# Patient Record
Sex: FEMALE | Race: WHITE | NOT HISPANIC OR LATINO
[De-identification: names, ages, dates, MRNs, and addresses within clinical notes are randomized per-mention and may not be internally consistent; named-entity substitution may affect disease eponyms.]

---

## 2017-08-28 ENCOUNTER — APPOINTMENT (OUTPATIENT)
Dept: OBGYN | Facility: CLINIC | Age: 67
End: 2017-08-28
Payer: COMMERCIAL

## 2017-08-28 PROCEDURE — 99397 PER PM REEVAL EST PAT 65+ YR: CPT

## 2019-04-01 ENCOUNTER — APPOINTMENT (OUTPATIENT)
Dept: SURGERY | Facility: CLINIC | Age: 69
End: 2019-04-01
Payer: MEDICARE

## 2019-04-01 VITALS
HEIGHT: 61 IN | TEMPERATURE: 98.1 F | HEART RATE: 62 BPM | DIASTOLIC BLOOD PRESSURE: 83 MMHG | BODY MASS INDEX: 33.99 KG/M2 | SYSTOLIC BLOOD PRESSURE: 149 MMHG | WEIGHT: 180 LBS

## 2019-04-01 DIAGNOSIS — R10.31 RIGHT LOWER QUADRANT PAIN: ICD-10-CM

## 2019-04-01 PROCEDURE — 99203 OFFICE O/P NEW LOW 30 MIN: CPT

## 2019-04-02 ENCOUNTER — OUTPATIENT (OUTPATIENT)
Dept: OUTPATIENT SERVICES | Facility: HOSPITAL | Age: 69
LOS: 1 days | End: 2019-04-02
Payer: MEDICARE

## 2019-04-02 ENCOUNTER — APPOINTMENT (OUTPATIENT)
Dept: CT IMAGING | Facility: CLINIC | Age: 69
End: 2019-04-02
Payer: MEDICARE

## 2019-04-02 DIAGNOSIS — Z98.89 OTHER SPECIFIED POSTPROCEDURAL STATES: Chronic | ICD-10-CM

## 2019-04-02 DIAGNOSIS — Z00.8 ENCOUNTER FOR OTHER GENERAL EXAMINATION: ICD-10-CM

## 2019-04-02 PROCEDURE — 74177 CT ABD & PELVIS W/CONTRAST: CPT | Mod: 26

## 2019-04-02 PROCEDURE — 74177 CT ABD & PELVIS W/CONTRAST: CPT

## 2019-04-02 PROCEDURE — 82565 ASSAY OF CREATININE: CPT

## 2019-04-03 NOTE — PHYSICAL EXAM
[Respiratory Effort] : normal respiratory effort [Alert] : alert [Oriented to Person] : oriented to person [Oriented to Place] : oriented to place [Oriented to Time] : oriented to time [Calm] : calm [JVD] : no jugular venous distention  [Abdominal Masses] : No abdominal masses [Abdomen Tenderness] : ~T ~M No abdominal tenderness [de-identified] : KELLY MOJICA NAD [de-identified] : EOMI [de-identified] : soft NT ND. No obvious bulges noted in the groins.

## 2019-04-03 NOTE — HISTORY OF PRESENT ILLNESS
[de-identified] : 69 yo female with h/o b/l inguinal hernia repair with mesh 30+ years ago presents today for evaluation of right groin pain which has been intermittent for the past 2-3 months. She denies any nausea or vomiting. No palpable bulges. She states using an exercise bike with pain at times and pain free other times.

## 2019-09-09 ENCOUNTER — RESULT REVIEW (OUTPATIENT)
Age: 69
End: 2019-09-09

## 2019-09-10 ENCOUNTER — APPOINTMENT (OUTPATIENT)
Dept: OBGYN | Facility: CLINIC | Age: 69
End: 2019-09-10
Payer: MEDICARE

## 2019-09-10 PROCEDURE — G0101: CPT

## 2019-09-30 ENCOUNTER — FORM ENCOUNTER (OUTPATIENT)
Age: 69
End: 2019-09-30

## 2020-06-05 ENCOUNTER — APPOINTMENT (OUTPATIENT)
Dept: ENDOCRINOLOGY | Facility: CLINIC | Age: 70
End: 2020-06-05
Payer: MEDICARE

## 2020-06-05 VITALS
OXYGEN SATURATION: 97 % | WEIGHT: 178 LBS | TEMPERATURE: 99.5 F | HEART RATE: 75 BPM | DIASTOLIC BLOOD PRESSURE: 80 MMHG | BODY MASS INDEX: 33.63 KG/M2 | SYSTOLIC BLOOD PRESSURE: 140 MMHG

## 2020-06-05 PROCEDURE — 99443: CPT | Mod: 95

## 2020-06-05 NOTE — REVIEW OF SYSTEMS
[Recent Weight Gain (___ Lbs)] : recent weight gain: [unfilled] lbs [Fatigue] : fatigue [Dysphagia] : no dysphagia [Neck Pain] : no neck pain [Dysphonia] : no dysphonia [Nausea] : no nausea [Vomiting] : no vomiting [Polyuria] : no polyuria [Constipation] : constipation [Hair Loss] : hair loss [Dry Skin] : dry skin [As Noted in HPI] : as noted in HPI [Tremors] : no tremors [Anxiety] : anxiety [Cold Intolerance] : no cold intolerance [Heat Intolerance] : no heat intolerance [All other systems negative] : All other systems negative [Negative] : Heme/Lymph

## 2020-06-05 NOTE — HISTORY OF PRESENT ILLNESS
[Home] : at home, [unfilled] , at the time of the visit. [Medical Office: ([facility])___] : at the medical office located in  [FreeTextEntry1] : 69 y/o female here for hypothyroidism but temp 99.2 x 3 and unable to see patient so tried telehealth but unable to connect so completed a telephone visit, new patient. No h/o T2DM. States maybe has hypertriglyceridemia.\par \par Hypothyroidism x 10 years approx\par No thyroid surgeries in the past\par No h/o radiation to the neck, no h/o lithium or amiodarone\par No h/o thyroid ultrasound or thyroid nodules\par Takes Levothyroxine 100 mcg QAM, takes it appropriately in AM. Has been on this dose for atleast 5 years\par Takes other meds later in the day - Lasix, GERD med and Lorazepam\par \par Admits to some fatigue, admits to being busy at the end of the day, sometimes takes nap in the day\par Also admits to exercising, lifts weights, has stationary bike\par Admits to weight uptrending x 15 yrs\par Menopause was approx in 1995, had a hysterectomy in the past with bladder reconstruction in 2015\par No cold or heat intolerance\par No constipation but takes 2 stool softeners every day or diarrhea\par Admits to hair loss and has anxiety related to it. \par No tremors, no palpitations\par No memory loss but admits to decline in constipation\par States she has anxiety\par \par FH: No thyroid disease. No autoimmune disease in the family

## 2020-06-08 ENCOUNTER — TRANSCRIPTION ENCOUNTER (OUTPATIENT)
Age: 70
End: 2020-06-08

## 2020-06-11 LAB
ALBUMIN SERPL ELPH-MCNC: 4.6 G/DL
ALP BLD-CCNC: 86 U/L
ALT SERPL-CCNC: 17 U/L
ANION GAP SERPL CALC-SCNC: 19 MMOL/L
AST SERPL-CCNC: 18 U/L
BASOPHILS # BLD AUTO: 0.04 K/UL
BASOPHILS NFR BLD AUTO: 0.7 %
BILIRUB SERPL-MCNC: 0.3 MG/DL
BUN SERPL-MCNC: 12 MG/DL
CALCIUM SERPL-MCNC: 9.5 MG/DL
CHLORIDE SERPL-SCNC: 100 MMOL/L
CO2 SERPL-SCNC: 22 MMOL/L
CREAT SERPL-MCNC: 0.8 MG/DL
EOSINOPHIL # BLD AUTO: 0.07 K/UL
EOSINOPHIL NFR BLD AUTO: 1.1 %
GLUCOSE SERPL-MCNC: 94 MG/DL
HCT VFR BLD CALC: 43 %
HGB BLD-MCNC: 13.7 G/DL
IMM GRANULOCYTES NFR BLD AUTO: 0.2 %
LYMPHOCYTES # BLD AUTO: 1.49 K/UL
LYMPHOCYTES NFR BLD AUTO: 24.3 %
MAN DIFF?: NORMAL
MCHC RBC-ENTMCNC: 28.6 PG
MCHC RBC-ENTMCNC: 31.9 GM/DL
MCV RBC AUTO: 89.8 FL
MONOCYTES # BLD AUTO: 0.54 K/UL
MONOCYTES NFR BLD AUTO: 8.8 %
NEUTROPHILS # BLD AUTO: 3.99 K/UL
NEUTROPHILS NFR BLD AUTO: 64.9 %
PLATELET # BLD AUTO: 243 K/UL
POTASSIUM SERPL-SCNC: 4.1 MMOL/L
PROT SERPL-MCNC: 7.3 G/DL
RBC # BLD: 4.79 M/UL
RBC # FLD: 13.2 %
SODIUM SERPL-SCNC: 142 MMOL/L
T4 FREE SERPL-MCNC: 1.7 NG/DL
THYROGLOB AB SERPL-ACNC: <20 IU/ML
THYROPEROXIDASE AB SERPL IA-ACNC: <10 IU/ML
TSH SERPL-ACNC: 1.85 UIU/ML
WBC # FLD AUTO: 6.14 K/UL

## 2020-08-23 ENCOUNTER — EMERGENCY (EMERGENCY)
Facility: HOSPITAL | Age: 70
LOS: 1 days | Discharge: HOME | End: 2020-08-23
Attending: EMERGENCY MEDICINE | Admitting: EMERGENCY MEDICINE
Payer: MEDICARE

## 2020-08-23 VITALS
OXYGEN SATURATION: 98 % | DIASTOLIC BLOOD PRESSURE: 92 MMHG | HEART RATE: 101 BPM | SYSTOLIC BLOOD PRESSURE: 145 MMHG | RESPIRATION RATE: 18 BRPM | TEMPERATURE: 98 F

## 2020-08-23 VITALS
SYSTOLIC BLOOD PRESSURE: 131 MMHG | HEART RATE: 65 BPM | OXYGEN SATURATION: 98 % | DIASTOLIC BLOOD PRESSURE: 46 MMHG | RESPIRATION RATE: 16 BRPM | TEMPERATURE: 98 F

## 2020-08-23 DIAGNOSIS — Z98.89 OTHER SPECIFIED POSTPROCEDURAL STATES: Chronic | ICD-10-CM

## 2020-08-23 LAB
ALBUMIN SERPL ELPH-MCNC: 4.5 G/DL — SIGNIFICANT CHANGE UP (ref 3.3–5)
ALP SERPL-CCNC: 111 U/L — SIGNIFICANT CHANGE UP (ref 40–120)
ALT FLD-CCNC: 22 U/L — SIGNIFICANT CHANGE UP (ref 4–33)
ANION GAP SERPL CALC-SCNC: 15 MMO/L — HIGH (ref 7–14)
APPEARANCE UR: CLEAR — SIGNIFICANT CHANGE UP
AST SERPL-CCNC: 23 U/L — SIGNIFICANT CHANGE UP (ref 4–32)
BACTERIA # UR AUTO: NEGATIVE — SIGNIFICANT CHANGE UP
BASE EXCESS BLDV CALC-SCNC: 4.7 MMOL/L — SIGNIFICANT CHANGE UP
BASOPHILS # BLD AUTO: 0.03 K/UL — SIGNIFICANT CHANGE UP (ref 0–0.2)
BASOPHILS NFR BLD AUTO: 0.3 % — SIGNIFICANT CHANGE UP (ref 0–2)
BILIRUB SERPL-MCNC: 0.3 MG/DL — SIGNIFICANT CHANGE UP (ref 0.2–1.2)
BILIRUB UR-MCNC: NEGATIVE — SIGNIFICANT CHANGE UP
BLOOD GAS VENOUS - CREATININE: 0.9 MG/DL — SIGNIFICANT CHANGE UP (ref 0.5–1.3)
BLOOD UR QL VISUAL: SIGNIFICANT CHANGE UP
BUN SERPL-MCNC: 14 MG/DL — SIGNIFICANT CHANGE UP (ref 7–23)
CALCIUM SERPL-MCNC: 9.2 MG/DL — SIGNIFICANT CHANGE UP (ref 8.4–10.5)
CHLORIDE BLDV-SCNC: 104 MMOL/L — SIGNIFICANT CHANGE UP (ref 96–108)
CHLORIDE SERPL-SCNC: 105 MMOL/L — SIGNIFICANT CHANGE UP (ref 98–107)
CO2 SERPL-SCNC: 26 MMOL/L — SIGNIFICANT CHANGE UP (ref 22–31)
COLOR SPEC: COLORLESS — SIGNIFICANT CHANGE UP
CREAT SERPL-MCNC: 0.8 MG/DL — SIGNIFICANT CHANGE UP (ref 0.5–1.3)
EOSINOPHIL # BLD AUTO: 0.07 K/UL — SIGNIFICANT CHANGE UP (ref 0–0.5)
EOSINOPHIL NFR BLD AUTO: 0.8 % — SIGNIFICANT CHANGE UP (ref 0–6)
GAS PNL BLDV: 141 MMOL/L — SIGNIFICANT CHANGE UP (ref 136–146)
GLUCOSE BLDV-MCNC: 99 MG/DL — SIGNIFICANT CHANGE UP (ref 70–99)
GLUCOSE SERPL-MCNC: 102 MG/DL — HIGH (ref 70–99)
GLUCOSE UR-MCNC: NEGATIVE — SIGNIFICANT CHANGE UP
HCO3 BLDV-SCNC: 26 MMOL/L — SIGNIFICANT CHANGE UP (ref 20–27)
HCT VFR BLD CALC: 40.2 % — SIGNIFICANT CHANGE UP (ref 34.5–45)
HCT VFR BLDV CALC: 43.1 % — SIGNIFICANT CHANGE UP (ref 34.5–45)
HGB BLD-MCNC: 13.2 G/DL — SIGNIFICANT CHANGE UP (ref 11.5–15.5)
HGB BLDV-MCNC: 14 G/DL — SIGNIFICANT CHANGE UP (ref 11.5–15.5)
HYALINE CASTS # UR AUTO: NEGATIVE — SIGNIFICANT CHANGE UP
IMM GRANULOCYTES NFR BLD AUTO: 0.2 % — SIGNIFICANT CHANGE UP (ref 0–1.5)
KETONES UR-MCNC: NEGATIVE — SIGNIFICANT CHANGE UP
LACTATE BLDV-MCNC: 1.2 MMOL/L — SIGNIFICANT CHANGE UP (ref 0.5–2)
LEUKOCYTE ESTERASE UR-ACNC: SIGNIFICANT CHANGE UP
LIDOCAIN IGE QN: 20.4 U/L — SIGNIFICANT CHANGE UP (ref 7–60)
LYMPHOCYTES # BLD AUTO: 1.68 K/UL — SIGNIFICANT CHANGE UP (ref 1–3.3)
LYMPHOCYTES # BLD AUTO: 19.2 % — SIGNIFICANT CHANGE UP (ref 13–44)
MCHC RBC-ENTMCNC: 28.3 PG — SIGNIFICANT CHANGE UP (ref 27–34)
MCHC RBC-ENTMCNC: 32.8 % — SIGNIFICANT CHANGE UP (ref 32–36)
MCV RBC AUTO: 86.1 FL — SIGNIFICANT CHANGE UP (ref 80–100)
MONOCYTES # BLD AUTO: 0.63 K/UL — SIGNIFICANT CHANGE UP (ref 0–0.9)
MONOCYTES NFR BLD AUTO: 7.2 % — SIGNIFICANT CHANGE UP (ref 2–14)
NEUTROPHILS # BLD AUTO: 6.34 K/UL — SIGNIFICANT CHANGE UP (ref 1.8–7.4)
NEUTROPHILS NFR BLD AUTO: 72.3 % — SIGNIFICANT CHANGE UP (ref 43–77)
NITRITE UR-MCNC: NEGATIVE — SIGNIFICANT CHANGE UP
NRBC # FLD: 0 K/UL — SIGNIFICANT CHANGE UP (ref 0–0)
PCO2 BLDV: 53 MMHG — HIGH (ref 41–51)
PH BLDV: 7.37 PH — SIGNIFICANT CHANGE UP (ref 7.32–7.43)
PH UR: 6.5 — SIGNIFICANT CHANGE UP (ref 5–8)
PLATELET # BLD AUTO: 199 K/UL — SIGNIFICANT CHANGE UP (ref 150–400)
PMV BLD: 10 FL — SIGNIFICANT CHANGE UP (ref 7–13)
PO2 BLDV: < 24 MMHG — LOW (ref 35–40)
POTASSIUM BLDV-SCNC: 3.6 MMOL/L — SIGNIFICANT CHANGE UP (ref 3.4–4.5)
POTASSIUM SERPL-MCNC: 4 MMOL/L — SIGNIFICANT CHANGE UP (ref 3.5–5.3)
POTASSIUM SERPL-SCNC: 4 MMOL/L — SIGNIFICANT CHANGE UP (ref 3.5–5.3)
PROT SERPL-MCNC: 7.7 G/DL — SIGNIFICANT CHANGE UP (ref 6–8.3)
PROT UR-MCNC: NEGATIVE — SIGNIFICANT CHANGE UP
RBC # BLD: 4.67 M/UL — SIGNIFICANT CHANGE UP (ref 3.8–5.2)
RBC # FLD: 13.1 % — SIGNIFICANT CHANGE UP (ref 10.3–14.5)
RBC CASTS # UR COMP ASSIST: SIGNIFICANT CHANGE UP (ref 0–?)
SAO2 % BLDV: 22.1 % — LOW (ref 60–85)
SODIUM SERPL-SCNC: 146 MMOL/L — HIGH (ref 135–145)
SP GR SPEC: 1.01 — SIGNIFICANT CHANGE UP (ref 1–1.04)
SQUAMOUS # UR AUTO: SIGNIFICANT CHANGE UP
UROBILINOGEN FLD QL: NORMAL — SIGNIFICANT CHANGE UP
WBC # BLD: 8.77 K/UL — SIGNIFICANT CHANGE UP (ref 3.8–10.5)
WBC # FLD AUTO: 8.77 K/UL — SIGNIFICANT CHANGE UP (ref 3.8–10.5)
WBC UR QL: HIGH (ref 0–?)

## 2020-08-23 PROCEDURE — 99284 EMERGENCY DEPT VISIT MOD MDM: CPT

## 2020-08-23 PROCEDURE — 74177 CT ABD & PELVIS W/CONTRAST: CPT | Mod: 26

## 2020-08-23 PROCEDURE — 71046 X-RAY EXAM CHEST 2 VIEWS: CPT | Mod: 26

## 2020-08-23 RX ORDER — CEFTRIAXONE 500 MG/1
1000 INJECTION, POWDER, FOR SOLUTION INTRAMUSCULAR; INTRAVENOUS ONCE
Refills: 0 | Status: DISCONTINUED | OUTPATIENT
Start: 2020-08-23 | End: 2020-08-23

## 2020-08-23 RX ORDER — ACETAMINOPHEN 500 MG
650 TABLET ORAL ONCE
Refills: 0 | Status: COMPLETED | OUTPATIENT
Start: 2020-08-23 | End: 2020-08-23

## 2020-08-23 RX ORDER — AZTREONAM 2 G
1 VIAL (EA) INJECTION
Qty: 6 | Refills: 0
Start: 2020-08-23 | End: 2020-08-25

## 2020-08-23 RX ORDER — SODIUM CHLORIDE 9 MG/ML
1000 INJECTION INTRAMUSCULAR; INTRAVENOUS; SUBCUTANEOUS ONCE
Refills: 0 | Status: COMPLETED | OUTPATIENT
Start: 2020-08-23 | End: 2020-08-23

## 2020-08-23 RX ADMIN — Medication 1 TABLET(S): at 18:16

## 2020-08-23 RX ADMIN — SODIUM CHLORIDE 1000 MILLILITER(S): 9 INJECTION INTRAMUSCULAR; INTRAVENOUS; SUBCUTANEOUS at 16:49

## 2020-08-23 RX ADMIN — Medication 650 MILLIGRAM(S): at 16:48

## 2020-08-23 NOTE — ED PROVIDER NOTE - CLINICAL SUMMARY MEDICAL DECISION MAKING FREE TEXT BOX
Juanito PGY-3:  71yo F w/ pmhx as described in HPI here w/ lower abd pain, unclear etiology possibly UTI in context of urinary frequency and sx, in this elderly F w/ low grade fever and abd pain will obtain CT AP, bloodwork, UA and reassess

## 2020-08-23 NOTE — ED PROVIDER NOTE - NSFOLLOWUPINSTRUCTIONS_ED_ALL_ED_FT
Take antibiotics as prescribed.  Take pain and nausea medication as needed.  Return to the ER if you are unable to keep down the antibiotics, if you have continued fever, vomiting despite the medication, or other concerns.  Follow up with your doctor for recheck in 3-5 days.  If you do not have a doctor, follow up with one of the numbers listed  PYELONEPHRITIS:You have been diagnosed with pyelonephritis, also known as a kidney infection.  Pyelonephritis is a bacterial infection in your kidneys. Symptoms include fever, chills, nausea and vomiting, back pain on one or both sides, and sometimes difficulty urinating. You may also feel very weak. The diagnosis is made based on your symptoms and a test of your urine.  Pyelonephritis most commonly occurs when a lower urinary tract infection (bladder infection) ascends (climbs up) the urinary tract and gets into the kidney. Early treatment of a bladder infection is important to prevent pyelonephritis and the possibility of kidney damage.  Pyelonephritis is treated with antibiotics, pain and fever medications, and fluids. Some patients require an "IV" if they have nausea or vomiting and cannot keep down the antibiotics, or if they aren¬ít improving after 2-3 days of oral (by mouth) medications.  Most patients do not need to be admitted to the hospital for pyelonephritis.  A few patients who don’t do well with the oral (by mouth) antibiotics or become sicker despite treatment may need to return to be admitted to the hospital.  YOU SHOULD SEEK MEDICAL ATTENTION IMMEDIATELY, EITHER HERE OR AT THE NEAREST EMERGENCY DEPARTMENT, IF ANY OF THE FOLLOWING OCCURS:  Failure to improve after 2-3 days of antibiotics. ·   You develop nausea or vomiting and are unable to keep down medications or fluids. ·   Increased weakness or lightheadedness. ·   You develop any progressive or worsening symptoms or any other concerns.

## 2020-08-23 NOTE — ED ADULT TRIAGE NOTE - CHIEF COMPLAINT QUOTE
p/t c/o of lower abd pain for few days with mild dysuria, p/t c/o of nausea as well, denies vomiting

## 2020-08-23 NOTE — ED ADULT NURSE NOTE - OBJECTIVE STATEMENT
Pt received to intake area complaining of abdominal pain and dysuria x few days. Pt also complaining of nausea, no vomiting. Pt denies chest pain, sob, fever or chills. IV access obtained, labs drawn and sent. Will continue to monitor.

## 2020-08-23 NOTE — ED PROVIDER NOTE - PATIENT PORTAL LINK FT
You can access the FollowMyHealth Patient Portal offered by [facility] by registering at the following website: [email or web address]. By joining [facility]’s FollowMyHealth portal, you will also be able to view your health information using other applications (apps) compatible with our system.

## 2020-08-23 NOTE — ED PROVIDER NOTE - PROGRESS NOTE DETAILS
Spoke with Dr Goldsmith- reviewed ct in past, has cervix and ovaries, s/p bladder suspension and hysterectomy in 2015.

## 2020-08-23 NOTE — ED PROVIDER NOTE - ATTENDING CONTRIBUTION TO CARE
DR. BLOCH, ATTENDING MD-  I performed a face to face bedside interview with patient regarding history of present illness, review of symptoms and past medical history. I completed an independent physical exam.  I have discussed patient's plan of care with the resident.  Patient examined well appearing overweight woman, NAD HEENT nml heart sounds, nml lungs, abd soft referred pain to lower abd with pressure upper abd, pressure sensation suprapubic area.pelvic supervised, pain on speculum but not on bimanual. extrem no edema, neuro nml, pulses intact

## 2020-08-23 NOTE — ED PROVIDER NOTE - NS ED ROS FT
CONSTITUTIONAL: +F/C  Eyes: No vision changes  Cardiovascular: No Chest pain  Respiratory: No SOB  Gastrointestinal: refer to HPI  Genitourinary: +Frequency  SKIN: no rashes.  NEURO: no headache, no weakness or numbness  PSYCHIATRIC: no known mental health issues.  Endocrine: No unexplained weight gain

## 2020-08-23 NOTE — ED PROVIDER NOTE - OBJECTIVE STATEMENT
69yo F h/o hypothyroidism    Lower abd pain radiating to back along with intermittent F/C radiating to lower back.   loose stools, no vomiting.     former smoker  surgeries: bladder prolapse repair, s/p hysterectomy  Meds: diuretic prn for edema, lorazepam   Allergies: Penicillin as a child 71yo F h/o hypothyroidism    Lower abd pain radiating to back along with intermittent F/C radiating to lower back for few days, coming in now as more convenient (was on vacation).   loose stools, no vomiting.     former smoker  surgeries: bladder prolapse repair, s/p hysterectomy  Meds: diuretic prn for edema, lorazepam   Allergies: Penicillin as a child

## 2020-08-23 NOTE — ED PROVIDER NOTE - PHYSICAL EXAMINATION
General: well appearing, interactive, well nourished, NAD  HEENT: pupils equal and reactive, normal external ears bilaterally   Cardiac: RRR, no MRG appreciated  Resp: lungs clear to auscultation bilaterally, symmetric chest wall rise  Abd: soft, nonspecific lower abdominal pain, nondistended,   : no CVA tenderness  Neuro: Moving all extremities  Skin:  normal color for race  Pelvic exam w/ attg Dr. Bloch: no CMT tenderness, no bleeding or discharge from canal

## 2020-08-25 LAB
-  AMIKACIN: SIGNIFICANT CHANGE UP
-  AMOXICILLIN/CLAVULANIC ACID: SIGNIFICANT CHANGE UP
-  AMPICILLIN/SULBACTAM: SIGNIFICANT CHANGE UP
-  AMPICILLIN: SIGNIFICANT CHANGE UP
-  AZTREONAM: SIGNIFICANT CHANGE UP
-  CEFAZOLIN: SIGNIFICANT CHANGE UP
-  CEFEPIME: SIGNIFICANT CHANGE UP
-  CEFOXITIN: SIGNIFICANT CHANGE UP
-  CEFTRIAXONE: SIGNIFICANT CHANGE UP
-  CIPROFLOXACIN: SIGNIFICANT CHANGE UP
-  ERTAPENEM: SIGNIFICANT CHANGE UP
-  GENTAMICIN: SIGNIFICANT CHANGE UP
-  LEVOFLOXACIN: SIGNIFICANT CHANGE UP
-  MEROPENEM: SIGNIFICANT CHANGE UP
-  NITROFURANTOIN: SIGNIFICANT CHANGE UP
-  PIPERACILLIN/TAZOBACTAM: SIGNIFICANT CHANGE UP
-  TOBRAMYCIN: SIGNIFICANT CHANGE UP
-  TRIMETHOPRIM/SULFAMETHOXAZOLE: SIGNIFICANT CHANGE UP
CULTURE RESULTS: SIGNIFICANT CHANGE UP
METHOD TYPE: SIGNIFICANT CHANGE UP
ORGANISM # SPEC MICROSCOPIC CNT: SIGNIFICANT CHANGE UP
ORGANISM # SPEC MICROSCOPIC CNT: SIGNIFICANT CHANGE UP
SPECIMEN SOURCE: SIGNIFICANT CHANGE UP

## 2020-09-09 ENCOUNTER — APPOINTMENT (OUTPATIENT)
Dept: UROGYNECOLOGY | Facility: CLINIC | Age: 70
End: 2020-09-09
Payer: MEDICARE

## 2020-09-09 VITALS
BODY MASS INDEX: 35.34 KG/M2 | WEIGHT: 180 LBS | SYSTOLIC BLOOD PRESSURE: 138 MMHG | DIASTOLIC BLOOD PRESSURE: 80 MMHG | HEIGHT: 60 IN | TEMPERATURE: 99 F

## 2020-09-09 LAB
BILIRUB UR QL STRIP: NORMAL
CLARITY UR: CLEAR
COLLECTION METHOD: NORMAL
GLUCOSE UR-MCNC: NORMAL
HCG UR QL: 0.2 EU/DL
HGB UR QL STRIP.AUTO: NORMAL
KETONES UR-MCNC: NORMAL
LEUKOCYTE ESTERASE UR QL STRIP: NORMAL
NITRITE UR QL STRIP: NORMAL
PH UR STRIP: 7
PROT UR STRIP-MCNC: NORMAL
SP GR UR STRIP: 1.01

## 2020-09-09 PROCEDURE — 99204 OFFICE O/P NEW MOD 45 MIN: CPT

## 2020-09-09 NOTE — PROCEDURE
[FreeTextEntry1] : Sterile straight catheterization was performed to measure a postvoid residual volume which was 50 cc; Udip negative

## 2020-09-09 NOTE — DISCUSSION/SUMMARY
Transition Communication Hand-off for Care Transitions to Next Level of Care Provider    Name: David Santos  MRN #: [id number]  Primary Care Provider: Bella Isaac  Primary Care MD Name: Dr isaac  Primary Clinic: [address] [facility] [address]  Primary Care Clinic Name: [facility]  Reason for Hospitalization:  Hyperglycemia [R73.9]  Admit Date/Time: 8/22/2017 10:04 AM  Discharge Date: 8/24/17 Payor Source: [place] / [place] / Product Type: Medicaid /     Readmission Assessment Measure (LISSETH) Risk Score/category: none         Reason for Communication Hand-off Referral: Other New Diabetic    Discharge Plan:       Concern for non-adherence with plan of care:   Y/N yes  Discharge Needs Assessment:      Already enrolled in Tele-monitoring program and name of program:  N.A  Follow-up specialty is recommended: Yes    Follow-up plan:  Future Appointments  Date Time Provider Department Center   8/25/2017 1:00 PM Chary Britt MD [facility]   8/28/2017 10:30 AM Bella Isaac MD [facility]   9/5/2017 9:00 AM CS DIABETIC ED RESOURCE [facility]       Any outstanding tests or procedures:        Referrals     Future Labs/Procedures    Diabetes Educator Referral     Comments:    DIABETES SELF MANAGEMENT TRAINING (DSMT)      Your provider has referred you to Diabetes Education: [facility]: Diabetes Education - All [facility] [phone]   [email or web address]    Type of training and number of hours: New Diagnosis: Initial group DSMT - 10 hours.      Medicare covers: 10 hours of initial DSMT in 12 month period from the time of first visit, plus 2 hours of follow-up DSMT annually, and additional hours as requested for insulin training.    Diabetes Type: Type 2 - On Insulin             Diabetes Co-Morbidities: hypoglycemia unawareness               A1C Goal:  <8.0       A1C is: Lab Results       Component                Value                [Reviewed Clinical Lab Test(s)] : Results of clinical tests were reviewed. Date                       A1C                      >16.0               08/22/2017              If an urgent visit is needed or A1C is above 12, Care Team to call the Diabetes Education Team at [phone] or send an In Basket message to the Diabetes Education Pool (P DIAB ED-PATIENT CARE).    Diabetes Education Topics: Comprehensive Knowledge Assessment and Instruction, Knowledge: Healthy Eating and Monitoring Blood Sugar, Blood glucose meter instruction  and Medication Start: Insulin:Special Educational Needs Requiring Individual DSMT: None       MEDICAL NUTRITION THERAPY (MNT) for Diabetes    Medical Nutrition Therapy with a Registered Dietitian can be provided in coordination with Diabetes Self-Management Training to assist in achieving optimal diabetes management.         Please be aware that coverage of these services is subject to the terms and limitations of your health insurance plan.  Call member services at your health plan to determine Diabetes Self-Management Training benefits and ask which blood glucose monitor brands are covered by your plan.      Please bring the following with you to your appointment:    (1)  List of current medications   (2)  List of Blood Glucose Monitor brands that are covered by your insurance plan  (3)  Blood Glucose Monitor and log book  (4)   Food records for the 3 days prior to your visit    The Certified Diabetes Educator may make diabetes medication adjustments per the CDE Protocol and Collaborative Practice Agreement.            Key Recommendations:   Pt is a newly diagnosed with diabetes.  Pls F/U wt pt to ensure he goes to his F/U appts as above.   Thank you!    Cass Ferrari RN CTS    AVS/Discharge Summary is the source of truth; this is a helpful guide for improved communication of patient story           [Reviewed Radiology Report(s)] : Radiology reports were reviewed. [FreeTextEntry1] : Exam findings reviewed, records from ER visit reviewed extensively. Udip negative today, PVR normal. There is no prolapse on exam. She has severe atrophy. \par \par -Urine sent today for culture. Udip negative\par -We reviewed methods of UTI prophylaxis including low dose vaginal estrogen. We reviewed risks and benefits and instructions for use. Will start Premarin 1 g intravaginally biw at bedtime. \par -If UTI symptoms, must get urine culture prior to starting treatment. If recurrent UTIs, will start additional ppx. \par -Start bowel regimen for constipation\par \par The following was provided to her in written form and reviewed extensively. Patient was given a copy to take home: \par To prevent UTIs and treat atrophy:\par Start low dose vaginal estrogen cream. It comes with an applicator that is inserted into the vagina at bedtime. Fill up the applicator with cream up to the 1 gram line. While lying in bed, gently insert the pre-filled applicator with the cream into the vagina ~2 inches inside the vagina. Hit the button to release the cream into the vagina. Let the cream stay inside the vagina overnight where it will absorb. \par \par Directions: Use twice weekly at bedtime (Mon/Thursday)\par \par For constipation:\par Start Culturelle daily in morning\par Start Benefiber daily in morning\par If still constipated, add Miralax at bedtime daily or every other day\par

## 2020-09-09 NOTE — PHYSICAL EXAM
[Atrophy] : atrophy [Dry Mucosa] : dry mucosa [No Bleeding] : there was no active vaginal bleeding [Absent] : absent [Chaperone Present] : A chaperone was present in the examining room during all aspects of the physical examination [Labia Majora] : were normal [Labia Minora] : were normal [Normal] : was normal [Exam Deferred] : was deferred [FreeTextEntry1] : General: Well, appearing. Alert and orientated. No acute distress\par HEENT: Normocephalic, atraumatic and extraocular muscles appear to be intact \par Neck: Full range of motion, no obvious lymphadenopathy, deformities, or masses noted \par Respiratory: Speaking in full sentences comfortably, normal work of breathing and no cough during visit\par Musculoskeletal: active full range of motion in extremities \par Extremities: No upper extremity edema noted\par Skin: no obvious rash or skin lesions\par Neuro: Orientated X 3, speech is fluent, normal rate\par Psych: Normal mood and affect \par   [Tenderness] : ~T no ~M abdominal tenderness observed [Distended] : not distended [H/Smegaly] : no hepatosplenomegaly [FreeTextEntry4] : severe atrophy, no mesh exposure, no vaginal banding. She denies tenderness on exam.  [de-identified] : no prolapse

## 2020-09-09 NOTE — REASON FOR VISIT
[Initial Visit ___] : an initial visit for [unfilled] [Questionnaire Received] : Patient questionnaire received [Painful Urination] : painful urination [Intake Form Reviewed] : Patient intake form with past medical history, surgical history, family history and social history reviewed today

## 2020-09-09 NOTE — HISTORY OF PRESENT ILLNESS
[Vaginal Wall Prolapse] : no [Rectal Prolapse] : no [Urinary Frequency] : moderate [Unable To Restrain Bowel Movement] : no [Feelings Of Urinary Urgency] : no [Urinary Tract Infection] : no [Constipation Obstructed Defecation] : moderate [] : days ago [Pelvic Pain] : no [Vaginal Pain] : no [de-identified] : 8/23/20 now resolved after treatment with antibiotics [FreeTextEntry1] : \par uYmiko presents for consultation for recent UTI. She reports bladder pain, dysuria which started 8/23/20 and was severe. She went to ER and underwent workup with urine culture which revealed >100 K Proteus. She had CT A/P, findings below. She was treated with Bactrim x 3 days and reports symptoms improved. She is very concerned this will happen again as the pain was severe. She has a h/o pelvic organ prolapse and is s/p surgery with MISTY, ASC, b/l salpingectomy 2015. She denies prolapse or incontinence symptoms. She is occasionally sexually active however denies being sexually active prior to recent UTI. \par \par 8/23/20 >100 K Proteus\par 8/23/20 CT A/P: no renal calculi, mild enhancement of kidney, no gross lesions

## 2020-09-12 LAB — BACTERIA UR CULT: NORMAL

## 2020-12-08 ENCOUNTER — APPOINTMENT (OUTPATIENT)
Dept: UROGYNECOLOGY | Facility: CLINIC | Age: 70
End: 2020-12-08
Payer: MEDICARE

## 2020-12-08 VITALS — DIASTOLIC BLOOD PRESSURE: 80 MMHG | TEMPERATURE: 96.2 F | SYSTOLIC BLOOD PRESSURE: 132 MMHG

## 2020-12-08 DIAGNOSIS — N39.0 URINARY TRACT INFECTION, SITE NOT SPECIFIED: ICD-10-CM

## 2020-12-08 DIAGNOSIS — N95.2 POSTMENOPAUSAL ATROPHIC VAGINITIS: ICD-10-CM

## 2020-12-08 PROCEDURE — 99213 OFFICE O/P EST LOW 20 MIN: CPT

## 2020-12-08 RX ORDER — CONJUGATED ESTROGENS 0.62 MG/G
0.62 CREAM VAGINAL
Qty: 3 | Refills: 3 | Status: DISCONTINUED | COMMUNITY
Start: 2020-09-09 | End: 2020-12-08

## 2020-12-08 NOTE — HISTORY OF PRESENT ILLNESS
[Vaginal Wall Prolapse] : no [Rectal Prolapse] : no [Unable To Restrain Bowel Movement] : no [Urinary Frequency] : no [Feelings Of Urinary Urgency] : no [Urinary Tract Infection] : no [Constipation Obstructed Defecation] : no [Stool Visible Blood] : no [Incomplete Emptying Of Stool] : no [Pelvic Pain] : no [Vaginal Pain] : no [FreeTextEntry6] : improved with prunes [FreeTextEntry1] : \par Yumiko presents for follow up. At her last visit she reported a h/o UTI. She had gone to ER and underwent workup with urine culture which revealed >100 K Proteus. She had CT A/P, findings below. Her exam was normal with exception of atrophy and she was started on low dose vaginal estrogen to treat atrophy and as UTI ppx.  She has a h/o pelvic organ prolapse and is s/p surgery with MISTY, ASC, b/l salpingectomy 2015. She denies prolapse or incontinence symptoms. She is occasionally sexually active however denied being sexually active prior to recent UTI. \par \par She was started on Premarin 0.5 g biw and reports side effects with use. She only used it for 3 weeks. Side effects including bloating and pelvic pain. She reports symptoms stopped once she discontinued use. She denies any further UTIs. \par \par 8/23/20 >100 K Proteus\par 8/23/20 CT A/P: no renal calculi, mild enhancement of kidney, no gross lesions

## 2020-12-08 NOTE — DISCUSSION/SUMMARY
[FreeTextEntry1] : \par We reviewed options. I recommend she stay off vaginal estrogen due to side effects. I do not recommend additional UTI ppx at this time as she has only had 1 UTI over the past several years. If she develops UTI symptoms, she will RTO for testing and culture. If she gets 2 UTIS in 6 months, or 3 in 1 year, will start UTI ppx with antibiotics vs Methenamine. Patient expressed understanding and agreement with plan. Will continue bowel regimen for constipation. She denies loose stools or FI. \par \par \par

## 2021-02-08 ENCOUNTER — APPOINTMENT (OUTPATIENT)
Dept: UROGYNECOLOGY | Facility: CLINIC | Age: 71
End: 2021-02-08

## 2021-05-02 ENCOUNTER — FORM ENCOUNTER (OUTPATIENT)
Age: 71
End: 2021-05-02

## 2021-05-07 ENCOUNTER — RESULT REVIEW (OUTPATIENT)
Age: 71
End: 2021-05-07

## 2021-05-07 ENCOUNTER — APPOINTMENT (OUTPATIENT)
Dept: MAMMOGRAPHY | Facility: CLINIC | Age: 71
End: 2021-05-07
Payer: MEDICARE

## 2021-05-07 PROCEDURE — 77063 BREAST TOMOSYNTHESIS BI: CPT

## 2021-05-07 PROCEDURE — 77067 SCR MAMMO BI INCL CAD: CPT

## 2021-05-09 ENCOUNTER — FORM ENCOUNTER (OUTPATIENT)
Age: 71
End: 2021-05-09

## 2021-05-25 ENCOUNTER — APPOINTMENT (OUTPATIENT)
Dept: ENDOCRINOLOGY | Facility: CLINIC | Age: 71
End: 2021-05-25
Payer: MEDICARE

## 2021-05-25 VITALS
SYSTOLIC BLOOD PRESSURE: 128 MMHG | OXYGEN SATURATION: 98 % | BODY MASS INDEX: 36.32 KG/M2 | HEIGHT: 60 IN | WEIGHT: 185 LBS | HEART RATE: 80 BPM | DIASTOLIC BLOOD PRESSURE: 80 MMHG

## 2021-05-25 PROCEDURE — 36415 COLL VENOUS BLD VENIPUNCTURE: CPT

## 2021-05-25 PROCEDURE — 99213 OFFICE O/P EST LOW 20 MIN: CPT | Mod: 25

## 2021-05-27 LAB
T4 FREE SERPL-MCNC: 1.5 NG/DL
T4 SERPL-MCNC: 8.3 UG/DL
TSH SERPL-ACNC: 2.79 UIU/ML

## 2021-07-30 ENCOUNTER — RX RENEWAL (OUTPATIENT)
Age: 71
End: 2021-07-30

## 2021-09-28 ENCOUNTER — FORM ENCOUNTER (OUTPATIENT)
Age: 71
End: 2021-09-28

## 2021-09-29 ENCOUNTER — APPOINTMENT (OUTPATIENT)
Dept: OBGYN | Facility: CLINIC | Age: 71
End: 2021-09-29
Payer: MEDICARE

## 2021-09-29 ENCOUNTER — RESULT REVIEW (OUTPATIENT)
Age: 71
End: 2021-09-29

## 2021-09-29 PROCEDURE — G0101: CPT

## 2021-10-06 ENCOUNTER — FORM ENCOUNTER (OUTPATIENT)
Age: 71
End: 2021-10-06

## 2021-11-17 ENCOUNTER — NON-APPOINTMENT (OUTPATIENT)
Age: 71
End: 2021-11-17

## 2021-11-22 ENCOUNTER — APPOINTMENT (OUTPATIENT)
Dept: ENDOCRINOLOGY | Facility: CLINIC | Age: 71
End: 2021-11-22
Payer: MEDICARE

## 2021-11-22 VITALS
OXYGEN SATURATION: 98 % | HEIGHT: 60 IN | DIASTOLIC BLOOD PRESSURE: 78 MMHG | TEMPERATURE: 97.2 F | HEART RATE: 78 BPM | BODY MASS INDEX: 36.32 KG/M2 | SYSTOLIC BLOOD PRESSURE: 126 MMHG | WEIGHT: 185 LBS

## 2021-11-22 PROCEDURE — 99213 OFFICE O/P EST LOW 20 MIN: CPT

## 2021-11-29 ENCOUNTER — APPOINTMENT (OUTPATIENT)
Dept: ENDOCRINOLOGY | Facility: CLINIC | Age: 71
End: 2021-11-29

## 2021-12-07 LAB
T4 FREE SERPL-MCNC: 1.7 NG/DL
T4 SERPL-MCNC: 9.3 UG/DL
TSH SERPL-ACNC: 6.1 UIU/ML

## 2022-03-01 ENCOUNTER — APPOINTMENT (OUTPATIENT)
Dept: OTOLARYNGOLOGY | Facility: CLINIC | Age: 72
End: 2022-03-01
Payer: MEDICARE

## 2022-03-01 VITALS
WEIGHT: 185 LBS | HEIGHT: 60 IN | SYSTOLIC BLOOD PRESSURE: 156 MMHG | HEART RATE: 67 BPM | DIASTOLIC BLOOD PRESSURE: 89 MMHG | BODY MASS INDEX: 36.32 KG/M2

## 2022-03-01 DIAGNOSIS — H61.21 IMPACTED CERUMEN, RIGHT EAR: ICD-10-CM

## 2022-03-01 PROCEDURE — 99203 OFFICE O/P NEW LOW 30 MIN: CPT

## 2022-03-01 RX ORDER — LANSOPRAZOLE 30 MG/1
CAPSULE, DELAYED RELEASE ORAL
Refills: 0 | Status: ACTIVE | COMMUNITY

## 2022-03-01 RX ORDER — TRIAMTERENE AND HYDROCHLOROTHIAZIDE 25; 37.5 MG/1; MG/1
37.5-25 TABLET ORAL
Refills: 0 | Status: ACTIVE | COMMUNITY

## 2022-03-01 NOTE — CONSULT LETTER
[Dear  ___] : Dear  [unfilled], [Consult Letter:] : I had the pleasure of evaluating your patient, [unfilled]. [Please see my note below.] : Please see my note below. [Consult Closing:] : Thank you very much for allowing me to participate in the care of this patient.  If you have any questions, please do not hesitate to contact me. [Sincerely,] : Sincerely, [FreeTextEntry2] : Arcadio Mccoy MD [FreeTextEntry3] : Dominic Salamanca MD, FACS\par Chief of Otolaryngology [facility]\par  - Dept. of Otolaryngology\par [employer]\par \par

## 2022-03-01 NOTE — PHYSICAL EXAM
[de-identified] : CI on R.  Removed.  Subcutaneous lesion of R EAC, suggestive of cyst. [de-identified] : Bilateral monomeric TM in small area, suggestive of prior BMT or perf.  [Midline] : trachea located in midline position [Normal] : no rashes

## 2022-03-01 NOTE — HISTORY OF PRESENT ILLNESS
[de-identified] : 71 year old female presents for initial evaluation for clogged right ear for about 3 years\par Reports about 4 years ago, she had some fluid drained from her right ear (unsure whether it was an abscess or cyst) by an ENT (unsure of doctor's name). Reports she believes it has come back because she is unable to clean her ear properly.\par Denies otalgia, otorrhea, recent fever/ear infections, changes in hearing, tinnitus, dizziness, vertigo, headaches related to hearing.

## 2022-03-03 ENCOUNTER — OUTPATIENT (OUTPATIENT)
Dept: OUTPATIENT SERVICES | Facility: HOSPITAL | Age: 72
LOS: 1 days | Discharge: HOME | End: 2022-03-03

## 2022-03-03 ENCOUNTER — APPOINTMENT (OUTPATIENT)
Dept: SPEECH THERAPY | Facility: CLINIC | Age: 72
End: 2022-03-03

## 2022-03-03 DIAGNOSIS — Z98.89 OTHER SPECIFIED POSTPROCEDURAL STATES: Chronic | ICD-10-CM

## 2022-03-10 DIAGNOSIS — H90.3 SENSORINEURAL HEARING LOSS, BILATERAL: ICD-10-CM

## 2022-03-11 RX ORDER — NITROFURANTOIN (MONOHYDRATE/MACROCRYSTALS) 25; 75 MG/1; MG/1
100 CAPSULE ORAL
Qty: 14 | Refills: 0 | Status: COMPLETED | COMMUNITY
Start: 2022-03-11 | End: 2022-03-18

## 2022-04-11 DIAGNOSIS — Z12.39 ENCOUNTER FOR OTHER SCREENING FOR MALIGNANT NEOPLASM OF BREAST: ICD-10-CM

## 2022-05-18 ENCOUNTER — APPOINTMENT (OUTPATIENT)
Dept: MAMMOGRAPHY | Facility: CLINIC | Age: 72
End: 2022-05-18
Payer: MEDICARE

## 2022-05-18 ENCOUNTER — RESULT REVIEW (OUTPATIENT)
Age: 72
End: 2022-05-18

## 2022-05-18 PROCEDURE — 77063 BREAST TOMOSYNTHESIS BI: CPT

## 2022-05-18 PROCEDURE — 77067 SCR MAMMO BI INCL CAD: CPT

## 2022-05-20 ENCOUNTER — RESULT REVIEW (OUTPATIENT)
Age: 72
End: 2022-05-20

## 2022-05-20 ENCOUNTER — APPOINTMENT (OUTPATIENT)
Dept: ULTRASOUND IMAGING | Facility: CLINIC | Age: 72
End: 2022-05-20
Payer: MEDICARE

## 2022-05-20 DIAGNOSIS — N63.0 UNSPECIFIED LUMP IN UNSPECIFIED BREAST: ICD-10-CM

## 2022-05-20 PROCEDURE — 76642 ULTRASOUND BREAST LIMITED: CPT | Mod: RT

## 2022-06-21 ENCOUNTER — APPOINTMENT (OUTPATIENT)
Dept: ENDOCRINOLOGY | Facility: CLINIC | Age: 72
End: 2022-06-21
Payer: MEDICARE

## 2022-06-21 ENCOUNTER — APPOINTMENT (OUTPATIENT)
Dept: ENDOCRINOLOGY | Facility: CLINIC | Age: 72
End: 2022-06-21

## 2022-06-21 VITALS
HEART RATE: 69 BPM | BODY MASS INDEX: 37.07 KG/M2 | WEIGHT: 189.8 LBS | OXYGEN SATURATION: 98 % | SYSTOLIC BLOOD PRESSURE: 146 MMHG | TEMPERATURE: 97.2 F | DIASTOLIC BLOOD PRESSURE: 84 MMHG

## 2022-06-21 LAB
T4 FREE SERPL-MCNC: 1.4 NG/DL
TSH SERPL-ACNC: 2.88 UIU/ML

## 2022-06-21 PROCEDURE — 99214 OFFICE O/P EST MOD 30 MIN: CPT

## 2022-06-21 RX ORDER — LORAZEPAM 2 MG/1
TABLET ORAL
Refills: 0 | Status: DISCONTINUED | COMMUNITY
End: 2022-06-21

## 2022-06-21 NOTE — REVIEW OF SYSTEMS
[Fatigue] : fatigue [As Noted in HPI] : as noted in HPI [Dry Skin] : dry skin [Hair Loss] : hair loss [Negative] : Heme/Lymph [All other systems negative] : All other systems negative [Dysphagia] : no dysphagia [Neck Pain] : no neck pain [Dysphonia] : no dysphonia [Nausea] : no nausea [Vomiting] : no vomiting [Polyuria] : no polyuria [Tremors] : no tremors [Cold Intolerance] : no cold intolerance [Heat Intolerance] : no heat intolerance [FreeTextEntry7] : Intermittent bowel symptoms, related to crohns.

## 2022-06-21 NOTE — HISTORY OF PRESENT ILLNESS
[FreeTextEntry1] : 73 y/o female here for f/u hypothyroidism\par \par Disease Course:\par Hypothyroidism x 10 years approx\par No thyroid surgeries in the past. No h/o radiation to the neck, no h/o lithium or amiodarone\par No h/o thyroid ultrasound or thyroid nodules\par \par Regimen: Synthroid 100 mcg QAM.\par Takes other meds later in the day - Lasix, GERD med- at least 3-4 hours later\par Menopause was approx in 1995, had a hysterectomy in the past with bladder reconstruction in 2015\par Endorses: weight issues, fatigue, intermittent constipation, hair loss\par Anxiety has improved. \par Denies: heat/cold intolerance\par FH: No thyroid disease. No autoimmune disease in the family\par \par Interim history 6/21/22:\par Every 2 weeks, just started Humira, recently diagnosed with crohns disease. \par

## 2022-06-21 NOTE — PHYSICAL EXAM
[Alert] : alert [Well Nourished] : well nourished [Healthy Appearance] : healthy appearance [No Acute Distress] : no acute distress [Well Developed] : well developed [Normal Sclera/Conjunctiva] : normal sclera/conjunctiva [EOMI] : extra ocular movement intact [No Proptosis] : no proptosis [Normal Outer Ear/Nose] : the ears and nose were normal in appearance [Normal Hearing] : hearing was normal [Normal Oropharynx] : the oropharynx was normal [Thyroid Not Enlarged] : the thyroid was not enlarged [No Thyroid Nodules] : no palpable thyroid nodules [No Respiratory Distress] : no respiratory distress [No Accessory Muscle Use] : no accessory muscle use [Normal Rate and Effort] : normal respiratory rate and effort [Clear to Auscultation] : lungs were clear to auscultation bilaterally [Normal S1, S2] : normal S1 and S2 [Normal Rate] : heart rate was normal [Regular Rhythm] : with a regular rhythm [No Edema] : no peripheral edema [Normal Bowel Sounds] : normal bowel sounds [Not Tender] : non-tender [Not Distended] : not distended [Soft] : abdomen soft [Normal Anterior Cervical Nodes] : no anterior cervical lymphadenopathy [No Spinal Tenderness] : no spinal tenderness [Spine Straight] : spine straight [No Stigmata of Cushings Syndrome] : no stigmata of Cushings Syndrome [Normal Gait] : normal gait [Normal Strength/Tone] : muscle strength and tone were normal [No Rash] : no rash [No Tremors] : no tremors [Oriented x3] : oriented to person, place, and time [Normal Affect] : the affect was normal [Normal Mood] : the mood was normal [Acanthosis Nigricans] : no acanthosis nigricans

## 2022-06-21 NOTE — ASSESSMENT
[Carbohydrate Consistent Diet] : carbohydrate consistent diet [Importance of Diet and Exercise] : importance of diet and exercise to improve glycemic control, achieve weight loss and improve cardiovascular health [Levothyroxine] : The patient was instructed to take Levothyroxine on an empty stomach, separate from vitamins, and wait at least 30 minutes before eating [FreeTextEntry1] : 70 y/o female with hypothyroidism, likely Hashimoto's thyroiditis\par \par Hypothyroidism\par - Check TFTs today\par - Continue Synthroid 100 mcg QD\par - Based on lab results, will adjust Synthroid dose as needed\par \par Recommend f/u office visit in 6 months, Dr. Andre. \par \par \par \par

## 2022-08-02 ENCOUNTER — APPOINTMENT (OUTPATIENT)
Dept: OTOLARYNGOLOGY | Facility: CLINIC | Age: 72
End: 2022-08-02

## 2022-08-02 VITALS
HEART RATE: 69 BPM | WEIGHT: 190 LBS | HEIGHT: 60 IN | TEMPERATURE: 97.2 F | DIASTOLIC BLOOD PRESSURE: 77 MMHG | SYSTOLIC BLOOD PRESSURE: 130 MMHG | OXYGEN SATURATION: 97 % | BODY MASS INDEX: 37.3 KG/M2

## 2022-08-02 DIAGNOSIS — Q18.1 PREAURICULAR SINUS AND CYST: ICD-10-CM

## 2022-08-02 PROCEDURE — 99212 OFFICE O/P EST SF 10 MIN: CPT

## 2022-08-02 NOTE — CONSULT LETTER
[Dear  ___] : Dear  [unfilled], [Courtesy Letter:] : I had the pleasure of seeing your patient, [unfilled], in my office today. [Please see my note below.] : Please see my note below. [Sincerely,] : Sincerely, [FreeTextEntry2] : Arcadio Mccoy MD [FreeTextEntry3] : Joseph Martínez MBA, PA-C\par Sr. Physician Assistant, Otolaryngology at [facility]\par Adjunct Clinical Instructor, Department of Physician Assistant Studies, [facility]\par \par [facility]\par [address]\[place] [address]\par

## 2022-08-02 NOTE — HISTORY OF PRESENT ILLNESS
[de-identified] : Ms. KING is a 72 year female who presents for follow up with a known cyst in her right EAC.  She denies any pain or discomfort but reported that she had a discharge from her right ear and thought that the cyst may have ruptured.  She notes that it is usually difficult to use a q-tip, but it was easier after the incident.  No fever, drainage, or tenderness.

## 2022-08-02 NOTE — REVIEW OF SYSTEMS
[As Noted in HPI] : as noted in HPI [Hearing Loss] : hearing loss [Ear Drainage] : ear drainage [Negative] : Heme/Lymph

## 2022-08-02 NOTE — PHYSICAL EXAM
[Midline] : trachea located in midline position [Normal] : no rashes [de-identified] : +cyst lesion in right EAC, minimal drainage

## 2022-08-11 ENCOUNTER — APPOINTMENT (OUTPATIENT)
Dept: OTOLARYNGOLOGY | Facility: CLINIC | Age: 72
End: 2022-08-11

## 2022-08-11 VITALS
SYSTOLIC BLOOD PRESSURE: 126 MMHG | BODY MASS INDEX: 36.71 KG/M2 | OXYGEN SATURATION: 97 % | HEART RATE: 63 BPM | WEIGHT: 187 LBS | DIASTOLIC BLOOD PRESSURE: 63 MMHG | TEMPERATURE: 97.5 F | RESPIRATION RATE: 12 BRPM | HEIGHT: 60 IN

## 2022-08-11 DIAGNOSIS — H61.899 OTHER SPECIFIED DISORDERS OF EXTERNAL EAR, UNSPECIFIED EAR: ICD-10-CM

## 2022-08-11 PROCEDURE — 99213 OFFICE O/P EST LOW 20 MIN: CPT

## 2022-08-11 RX ORDER — COVID-19 ANTIGEN TEST
KIT MISCELLANEOUS
Qty: 8 | Refills: 0 | Status: ACTIVE | COMMUNITY
Start: 2022-08-05

## 2022-08-11 NOTE — CONSULT LETTER
[Dear  ___] : Dear  [unfilled], [Courtesy Letter:] : I had the pleasure of seeing your patient, [unfilled], in my office today. [Please see my note below.] : Please see my note below. [Consult Closing:] : Thank you very much for allowing me to participate in the care of this patient.  If you have any questions, please do not hesitate to contact me. [Sincerely,] : Sincerely, [FreeTextEntry2] : Arcadio Mccoy MD [FreeTextEntry3] : Dominic Salamanca MD, FACS\par Chief of Otolaryngology and Head & Neck Surgery\par [facility]\par  - Dept. of Otolaryngology\par [facility] [employer]\par \par

## 2022-08-11 NOTE — HISTORY OF PRESENT ILLNESS
[de-identified] : 71 y/o F with a R EAC cyst.  Pt states that it drained last week.  She is here for f/u.

## 2022-08-11 NOTE — PHYSICAL EXAM
[de-identified] : R EAC cyst gone [de-identified] : Bilateral monomeric TM in small area, suggestive of prior BMT or perf.  [Midline] : trachea located in midline position [Normal] : no rashes

## 2022-08-31 ENCOUNTER — EMERGENCY (EMERGENCY)
Facility: HOSPITAL | Age: 72
LOS: 1 days | Discharge: LEFT BEFORE DISCHARGE | End: 2022-08-31
Admitting: EMERGENCY MEDICINE

## 2022-08-31 VITALS
DIASTOLIC BLOOD PRESSURE: 71 MMHG | HEART RATE: 66 BPM | SYSTOLIC BLOOD PRESSURE: 181 MMHG | HEIGHT: 61 IN | TEMPERATURE: 97 F | RESPIRATION RATE: 17 BRPM | OXYGEN SATURATION: 100 %

## 2022-08-31 DIAGNOSIS — Z98.89 OTHER SPECIFIED POSTPROCEDURAL STATES: Chronic | ICD-10-CM

## 2022-08-31 PROCEDURE — L9991: CPT

## 2022-08-31 NOTE — ED ADULT TRIAGE NOTE - EXPLANATION OF PATIENT'S REASON FOR LEAVING
Pt educated and encouraged to stay and be seen by a provider Pt reports "I do not have an emergency I am going home". ambulatory with family out of ED.

## 2022-08-31 NOTE — ED ADULT TRIAGE NOTE - CHIEF COMPLAINT QUOTE
Pt c/o rash on upper chest and b/l arms, abd, and back with no open sores noticed 1 week ago. Pt c/o intermittent itchiness and burning sensation on sites. Pt prescribed allegra, mometasone furoate cream and clobetasol propionate cream with no relief. Denies SOB, difficulty breathing, fever, chills. PMHx Crohn's disease, hypothyroidism

## 2022-09-08 ENCOUNTER — APPOINTMENT (OUTPATIENT)
Dept: OTOLARYNGOLOGY | Facility: CLINIC | Age: 72
End: 2022-09-08

## 2022-12-22 ENCOUNTER — APPOINTMENT (OUTPATIENT)
Dept: ENDOCRINOLOGY | Facility: CLINIC | Age: 72
End: 2022-12-22

## 2022-12-22 VITALS
DIASTOLIC BLOOD PRESSURE: 74 MMHG | BODY MASS INDEX: 36.32 KG/M2 | HEIGHT: 60 IN | HEART RATE: 69 BPM | OXYGEN SATURATION: 97 % | SYSTOLIC BLOOD PRESSURE: 122 MMHG | WEIGHT: 185 LBS

## 2022-12-22 DIAGNOSIS — Z78.0 ASYMPTOMATIC MENOPAUSAL STATE: ICD-10-CM

## 2022-12-22 PROCEDURE — 99214 OFFICE O/P EST MOD 30 MIN: CPT

## 2022-12-22 NOTE — HISTORY OF PRESENT ILLNESS
[FreeTextEntry1] : 73 y/o female here for f/u hypothyroidism\par \par Disease Course:\par Hypothyroidism x 10 years approx\par No thyroid surgeries in the past. No h/o radiation to the neck, no h/o lithium or amiodarone\par No h/o thyroid ultrasound or thyroid nodules\par \par Regimen: Synthroid 100 mcg QAM.\par Takes other meds later in the day - Lasix, GERD med- at least 3-4 hours later\par Menopause was approx in 1995, had a hysterectomy in the past with bladder reconstruction in 2015\par Endorses: weight issues, fatigue, intermittent constipation, hair loss\par Anxiety has improved. \par Denies: heat/cold intolerance\par FH: No thyroid disease. No autoimmune disease in the family\par \par Interim history\par Every 2 weeks, just started Humira, recently diagnosed with crohns disease - in 6/2022\par taking LT4 each morning on empty stomach\par had covid last week\par had labs recently will have them faxed \par

## 2022-12-22 NOTE — ASSESSMENT
[Carbohydrate Consistent Diet] : carbohydrate consistent diet [Importance of Diet and Exercise] : importance of diet and exercise to improve glycemic control, achieve weight loss and improve cardiovascular health [Levothyroxine] : The patient was instructed to take Levothyroxine on an empty stomach, separate from vitamins, and wait at least 30 minutes before eating [FreeTextEntry1] : 73 y/o female with hypothyroidism, likely Hashimoto's thyroiditis\par \par Hypothyroidism\par - pt will have recent labs faxed to me\par - will not check today as may be abnormal from recent covid \par - Continue Synthroid 100 mcg QD. Has been on stable dose for years\par - advised she take LT4 on empty stomach daily and apart from other meds\par \par Bone health/menopause\par - check DXA next visit\par - pt denies hx of fracture\par - continue weight bearing exercise, calcium/vit d supplementation\par \par Recommend f/u office visit in 6 months\par \par \par \par

## 2023-01-19 RX ORDER — NITROFURANTOIN (MONOHYDRATE/MACROCRYSTALS) 25; 75 MG/1; MG/1
100 CAPSULE ORAL
Qty: 10 | Refills: 0 | Status: ACTIVE | COMMUNITY
Start: 2023-01-19 | End: 1900-01-01

## 2023-02-16 ENCOUNTER — TRANSCRIPTION ENCOUNTER (OUTPATIENT)
Age: 73
End: 2023-02-16

## 2023-02-16 DIAGNOSIS — Z12.31 ENCOUNTER FOR SCREENING MAMMOGRAM FOR MALIGNANT NEOPLASM OF BREAST: ICD-10-CM

## 2023-02-16 DIAGNOSIS — R92.2 INCONCLUSIVE MAMMOGRAM: ICD-10-CM

## 2023-04-03 ENCOUNTER — NON-APPOINTMENT (OUTPATIENT)
Age: 73
End: 2023-04-03

## 2023-04-06 ENCOUNTER — APPOINTMENT (OUTPATIENT)
Dept: ORTHOPEDIC SURGERY | Facility: CLINIC | Age: 73
End: 2023-04-06

## 2023-05-11 ENCOUNTER — APPOINTMENT (OUTPATIENT)
Dept: MAMMOGRAPHY | Facility: CLINIC | Age: 73
End: 2023-05-11
Payer: MEDICARE

## 2023-05-11 ENCOUNTER — RESULT REVIEW (OUTPATIENT)
Age: 73
End: 2023-05-11

## 2023-05-11 PROCEDURE — 77063 BREAST TOMOSYNTHESIS BI: CPT

## 2023-05-11 PROCEDURE — 77067 SCR MAMMO BI INCL CAD: CPT

## 2023-06-13 ENCOUNTER — RESULT REVIEW (OUTPATIENT)
Age: 73
End: 2023-06-13

## 2023-06-13 ENCOUNTER — APPOINTMENT (OUTPATIENT)
Dept: ULTRASOUND IMAGING | Facility: CLINIC | Age: 73
End: 2023-06-13
Payer: MEDICARE

## 2023-06-13 ENCOUNTER — APPOINTMENT (OUTPATIENT)
Dept: MAMMOGRAPHY | Facility: CLINIC | Age: 73
End: 2023-06-13
Payer: MEDICARE

## 2023-06-13 PROCEDURE — 76642 ULTRASOUND BREAST LIMITED: CPT | Mod: RT

## 2023-06-13 PROCEDURE — 77065 DX MAMMO INCL CAD UNI: CPT | Mod: RT

## 2023-06-13 PROCEDURE — G0279: CPT | Mod: RT

## 2023-06-22 ENCOUNTER — APPOINTMENT (OUTPATIENT)
Dept: ENDOCRINOLOGY | Facility: CLINIC | Age: 73
End: 2023-06-22
Payer: MEDICARE

## 2023-06-22 VITALS
DIASTOLIC BLOOD PRESSURE: 78 MMHG | HEIGHT: 60.25 IN | OXYGEN SATURATION: 98 % | WEIGHT: 186 LBS | SYSTOLIC BLOOD PRESSURE: 118 MMHG | BODY MASS INDEX: 36.04 KG/M2 | HEART RATE: 69 BPM

## 2023-06-22 PROCEDURE — ZZZZZ: CPT

## 2023-06-22 PROCEDURE — 99214 OFFICE O/P EST MOD 30 MIN: CPT | Mod: 25

## 2023-06-22 PROCEDURE — 77085 DXA BONE DENSITY AXL VRT FX: CPT | Mod: GA

## 2023-06-22 NOTE — REVIEW OF SYSTEMS
[As Noted in HPI] : as noted in HPI [Dry Skin] : dry skin [Hair Loss] : hair loss [Negative] : Heme/Lymph [All other systems negative] : All other systems negative [Fatigue] : no fatigue [Dysphagia] : no dysphagia [Neck Pain] : no neck pain [Dysphonia] : no dysphonia [Nausea] : no nausea [Vomiting] : no vomiting [Polyuria] : no polyuria [Tremors] : no tremors [Cold Intolerance] : no cold intolerance [Heat Intolerance] : no heat intolerance [FreeTextEntry7] : Intermittent bowel symptoms, related to crohns.

## 2023-06-22 NOTE — ASSESSMENT
[Carbohydrate Consistent Diet] : carbohydrate consistent diet [Importance of Diet and Exercise] : importance of diet and exercise to improve glycemic control, achieve weight loss and improve cardiovascular health [Levothyroxine] : The patient was instructed to take Levothyroxine on an empty stomach, separate from vitamins, and wait at least 30 minutes before eating [FreeTextEntry1] : 72 y/o female with hypothyroidism, likely Hashimoto's thyroiditis\par \par Hypothyroidism\par - Continue Synthroid 100 mcg QD. Has been on stable dose for years\par -continue to take LT4 on empty stomach daily and apart from other meds\par \par Osteopenia\par - DXA 6/2025\par - pt denies hx of fracture\par - continue weight bearing exercise, calcium/vit d supplementation\par - check PTH, cmp\par - taking vit d 2000 iu daily \par \par Recommend f/u office visit in 6 months\par \par \par \par

## 2023-06-22 NOTE — HISTORY OF PRESENT ILLNESS
[FreeTextEntry1] : 74 y/o female here for f/u hypothyroidism and osteopenia here for follow up.\par \par Disease Course:\par Hypothyroidism x 10 years approx\par No thyroid surgeries in the past. No h/o radiation to the neck, no h/o lithium or amiodarone\par No h/o thyroid ultrasound or thyroid nodules\par \par Regimen: Synthroid 100 mcg QAM.\par Takes other meds later in the day - Lasix, GERD med- at least 3-4 hours later\par Menopause was approx in 1995, had a hysterectomy in the past with bladder reconstruction in 2015\par Endorses: weight issues, fatigue, intermittent constipation, hair loss\par Anxiety has improved. \par Denies: heat/cold intolerance\par FH: No thyroid disease. No autoimmune disease in the family\par \par Interim history\par was allergic to humira, diagnosed with crohns disease - in 6/2022; now on stelara \par has been on pred taper for knee pain\par now on entivio\par no falls fractures\par taking LT4 each morning on empty stomach\par \par DXA today showing osteopenia\par L1-L4 T 0.2 \par LFN T-1.3 osteopenia\par 1/3 forearm T-1.3, total forearm T-0.9\par Frax score major OP risk 9.2%, hip fracture 1.3%\par \par \par \par \par

## 2023-06-23 ENCOUNTER — APPOINTMENT (OUTPATIENT)
Dept: ORTHOPEDIC SURGERY | Facility: CLINIC | Age: 73
End: 2023-06-23

## 2023-06-23 ENCOUNTER — TRANSCRIPTION ENCOUNTER (OUTPATIENT)
Age: 73
End: 2023-06-23

## 2023-06-23 LAB
25(OH)D3 SERPL-MCNC: 44.4 NG/ML
ALBUMIN SERPL ELPH-MCNC: 4.8 G/DL
ALP BLD-CCNC: 87 U/L
ALT SERPL-CCNC: 14 U/L
ANION GAP SERPL CALC-SCNC: 12 MMOL/L
AST SERPL-CCNC: 18 U/L
BILIRUB SERPL-MCNC: 0.4 MG/DL
BUN SERPL-MCNC: 22 MG/DL
CALCIUM SERPL-MCNC: 9.5 MG/DL
CALCIUM SERPL-MCNC: 9.5 MG/DL
CHLORIDE SERPL-SCNC: 102 MMOL/L
CO2 SERPL-SCNC: 25 MMOL/L
CREAT SERPL-MCNC: 0.7 MG/DL
EGFR: 92 ML/MIN/1.73M2
GLUCOSE SERPL-MCNC: 79 MG/DL
PARATHYROID HORMONE INTACT: 33 PG/ML
POTASSIUM SERPL-SCNC: 4.6 MMOL/L
PROT SERPL-MCNC: 7.6 G/DL
SODIUM SERPL-SCNC: 139 MMOL/L
TSH SERPL-ACNC: 2.18 UIU/ML

## 2023-10-03 ENCOUNTER — APPOINTMENT (OUTPATIENT)
Dept: OBGYN | Facility: CLINIC | Age: 73
End: 2023-10-03
Payer: MEDICARE

## 2023-10-03 VITALS
HEIGHT: 60 IN | WEIGHT: 186 LBS | SYSTOLIC BLOOD PRESSURE: 122 MMHG | BODY MASS INDEX: 36.52 KG/M2 | DIASTOLIC BLOOD PRESSURE: 67 MMHG

## 2023-10-03 DIAGNOSIS — Z01.419 ENCOUNTER FOR GYNECOLOGICAL EXAMINATION (GENERAL) (ROUTINE) W/OUT ABNORMAL FINDINGS: ICD-10-CM

## 2023-10-03 DIAGNOSIS — Z11.51 ENCOUNTER FOR SCREENING FOR HUMAN PAPILLOMAVIRUS (HPV): ICD-10-CM

## 2023-10-03 LAB — BACTERIA UR CULT: NORMAL

## 2023-10-03 PROCEDURE — G0101: CPT

## 2023-11-13 DIAGNOSIS — R92.8 OTHER ABNORMAL AND INCONCLUSIVE FINDINGS ON DIAGNOSTIC IMAGING OF BREAST: ICD-10-CM

## 2023-12-15 ENCOUNTER — RESULT REVIEW (OUTPATIENT)
Age: 73
End: 2023-12-15

## 2023-12-15 ENCOUNTER — APPOINTMENT (OUTPATIENT)
Dept: ULTRASOUND IMAGING | Facility: CLINIC | Age: 73
End: 2023-12-15
Payer: MEDICARE

## 2023-12-15 ENCOUNTER — APPOINTMENT (OUTPATIENT)
Dept: MAMMOGRAPHY | Facility: CLINIC | Age: 73
End: 2023-12-15
Payer: MEDICARE

## 2023-12-15 PROCEDURE — 77065 DX MAMMO INCL CAD UNI: CPT | Mod: RT

## 2023-12-15 PROCEDURE — 76642 ULTRASOUND BREAST LIMITED: CPT | Mod: RT

## 2023-12-15 PROCEDURE — G0279: CPT | Mod: RT

## 2024-03-24 LAB
CYTOLOGY CVX/VAG DOC THIN PREP: NORMAL
HPV HIGH+LOW RISK DNA PNL CVX: NOT DETECTED

## 2024-04-09 ENCOUNTER — APPOINTMENT (OUTPATIENT)
Dept: RHEUMATOLOGY | Facility: CLINIC | Age: 74
End: 2024-04-09
Payer: MEDICARE

## 2024-04-09 ENCOUNTER — LABORATORY RESULT (OUTPATIENT)
Age: 74
End: 2024-04-09

## 2024-04-09 ENCOUNTER — NON-APPOINTMENT (OUTPATIENT)
Age: 74
End: 2024-04-09

## 2024-04-09 VITALS
SYSTOLIC BLOOD PRESSURE: 163 MMHG | OXYGEN SATURATION: 98 % | BODY MASS INDEX: 36.91 KG/M2 | DIASTOLIC BLOOD PRESSURE: 72 MMHG | TEMPERATURE: 98 F | HEART RATE: 69 BPM | HEIGHT: 60 IN | WEIGHT: 188 LBS

## 2024-04-09 VITALS — DIASTOLIC BLOOD PRESSURE: 88 MMHG | SYSTOLIC BLOOD PRESSURE: 144 MMHG

## 2024-04-09 DIAGNOSIS — M25.50 PAIN IN UNSPECIFIED JOINT: ICD-10-CM

## 2024-04-09 PROCEDURE — G2211 COMPLEX E/M VISIT ADD ON: CPT

## 2024-04-09 PROCEDURE — 99204 OFFICE O/P NEW MOD 45 MIN: CPT

## 2024-04-09 RX ORDER — CYCLOBENZAPRINE HYDROCHLORIDE 5 MG/1
5 TABLET, FILM COATED ORAL
Qty: 30 | Refills: 0 | Status: ACTIVE | COMMUNITY
Start: 2024-04-09 | End: 1900-01-01

## 2024-04-11 ENCOUNTER — APPOINTMENT (OUTPATIENT)
Dept: RADIOLOGY | Facility: CLINIC | Age: 74
End: 2024-04-11
Payer: MEDICARE

## 2024-04-11 PROCEDURE — 72202 X-RAY EXAM SI JOINTS 3/> VWS: CPT

## 2024-04-11 PROCEDURE — 72100 X-RAY EXAM L-S SPINE 2/3 VWS: CPT

## 2024-04-11 PROCEDURE — 73600 X-RAY EXAM OF ANKLE: CPT | Mod: 50

## 2024-04-11 PROCEDURE — 73620 X-RAY EXAM OF FOOT: CPT | Mod: 50

## 2024-04-11 PROCEDURE — 73130 X-RAY EXAM OF HAND: CPT | Mod: 50

## 2024-04-11 PROCEDURE — 73030 X-RAY EXAM OF SHOULDER: CPT | Mod: 50

## 2024-04-30 NOTE — PHYSICAL EXAM
[General Appearance - Alert] : alert [General Appearance - In No Acute Distress] : in no acute distress [General Appearance - Well Nourished] : well nourished [] : no rash [Sensation] : the sensory exam was normal to light touch and pinprick [Motor Exam] : the motor exam was normal [Oriented To Time, Place, And Person] : oriented to person, place, and time [Musculoskeletal - Swelling] : no joint swelling seen

## 2024-04-30 NOTE — ASSESSMENT
[FreeTextEntry1] : 73-year-old female with Crohn's disease presenting with intermittent polyarthralgias.   Possible enteropathic arthritis versus Entyvio induced arthralgias Arthralgias started shortly after she was started on Entyvio.  Entyvio has been reported to cause arthralgias in 12% of cases.  However, Entyvio has significantly helped her IBD and therefore should likely be continued.   Plan: -check inflammatory arthritis labs -check xrays of hands, feet, shoulders, lumbar and sacroiliac spine -Discuss with her gastroenterologist -Follow-up after above

## 2024-04-30 NOTE — HISTORY OF PRESENT ILLNESS
[FreeTextEntry1] : 73-year-old female coming for joint pain evaluation.   Complaints: Chronic right shoulder pain, has gotten IACI in the past which has helped Right knee pain, history of Baker's cyst, drained multiple times,  last in 8/2023, follows with orthopedist Dr. Bashir Past few months with intermittent polyarthralgias, mostly hands, left shoulder, ankles.  Cannot take NSAIDS. Take Tylenol XS which helps  Diagnosed with Crohns disease  Follows with GI DRgabriel Sierra / Dr. Sampson Severe allergic reaction (hives) on Humira Stelara infusion - shivering  On Entyvio since 9/2023 which she has tolerated well.   FH: mother Crohns

## 2024-05-09 ENCOUNTER — RESULT REVIEW (OUTPATIENT)
Age: 74
End: 2024-05-09

## 2024-05-09 DIAGNOSIS — R92.1 MAMMOGRAPHIC CALCIFICATION FOUND ON DIAGNOSTIC IMAGING OF BREAST: ICD-10-CM

## 2024-05-13 LAB
ALBUMIN SERPL ELPH-MCNC: 4.5 G/DL
ALP BLD-CCNC: 107 U/L
ALT SERPL-CCNC: 16 U/L
ANION GAP SERPL CALC-SCNC: 12 MMOL/L
AST SERPL-CCNC: 19 U/L
BASOPHILS # BLD AUTO: 0.04 K/UL
BASOPHILS NFR BLD AUTO: 0.6 %
BILIRUB SERPL-MCNC: 0.4 MG/DL
BUN SERPL-MCNC: 16 MG/DL
CALCIUM SERPL-MCNC: 9.6 MG/DL
CCP AB SER IA-ACNC: <8 UNITS
CHLORIDE SERPL-SCNC: 103 MMOL/L
CO2 SERPL-SCNC: 24 MMOL/L
CREAT SERPL-MCNC: 0.76 MG/DL
CRP SERPL-MCNC: 8 MG/L
EGFR: 83 ML/MIN/1.73M2
EOSINOPHIL # BLD AUTO: 0.12 K/UL
EOSINOPHIL NFR BLD AUTO: 1.9 %
ERYTHROCYTE [SEDIMENTATION RATE] IN BLOOD BY WESTERGREN METHOD: 23 MM/HR
GLUCOSE SERPL-MCNC: 106 MG/DL
HCT VFR BLD CALC: 42.1 %
HGB BLD-MCNC: 13.6 G/DL
IMM GRANULOCYTES NFR BLD AUTO: 0.2 %
LYMPHOCYTES # BLD AUTO: 1.47 K/UL
LYMPHOCYTES NFR BLD AUTO: 22.7 %
MAN DIFF?: NORMAL
MCHC RBC-ENTMCNC: 29.4 PG
MCHC RBC-ENTMCNC: 32.3 GM/DL
MCV RBC AUTO: 90.9 FL
MONOCYTES # BLD AUTO: 0.43 K/UL
MONOCYTES NFR BLD AUTO: 6.6 %
NEUTROPHILS # BLD AUTO: 4.4 K/UL
NEUTROPHILS NFR BLD AUTO: 68 %
PLATELET # BLD AUTO: 205 K/UL
POTASSIUM SERPL-SCNC: 4.5 MMOL/L
PROT SERPL-MCNC: 7.5 G/DL
RBC # BLD: 4.63 M/UL
RBC # FLD: 13.4 %
RF+CCP IGG SER-IMP: NEGATIVE
RHEUMATOID FACT SER QL: 11 IU/ML
SODIUM SERPL-SCNC: 140 MMOL/L
WBC # FLD AUTO: 6.47 K/UL

## 2024-06-11 ENCOUNTER — RESULT REVIEW (OUTPATIENT)
Age: 74
End: 2024-06-11

## 2024-06-11 ENCOUNTER — APPOINTMENT (OUTPATIENT)
Dept: ULTRASOUND IMAGING | Facility: CLINIC | Age: 74
End: 2024-06-11
Payer: MEDICARE

## 2024-06-11 ENCOUNTER — APPOINTMENT (OUTPATIENT)
Dept: MAMMOGRAPHY | Facility: CLINIC | Age: 74
End: 2024-06-11
Payer: MEDICARE

## 2024-06-11 PROCEDURE — 76642 ULTRASOUND BREAST LIMITED: CPT | Mod: RT

## 2024-06-11 PROCEDURE — G0279: CPT

## 2024-06-11 PROCEDURE — 77066 DX MAMMO INCL CAD BI: CPT

## 2024-06-20 ENCOUNTER — APPOINTMENT (OUTPATIENT)
Dept: ENDOCRINOLOGY | Facility: CLINIC | Age: 74
End: 2024-06-20

## 2024-06-25 ENCOUNTER — APPOINTMENT (OUTPATIENT)
Dept: ENDOCRINOLOGY | Facility: CLINIC | Age: 74
End: 2024-06-25
Payer: MEDICARE

## 2024-06-25 VITALS
SYSTOLIC BLOOD PRESSURE: 130 MMHG | OXYGEN SATURATION: 97 % | WEIGHT: 190 LBS | HEIGHT: 60 IN | BODY MASS INDEX: 37.3 KG/M2 | HEART RATE: 63 BPM | DIASTOLIC BLOOD PRESSURE: 90 MMHG

## 2024-06-25 DIAGNOSIS — E78.1 PURE HYPERGLYCERIDEMIA: ICD-10-CM

## 2024-06-25 DIAGNOSIS — M85.80 OTHER SPECIFIED DISORDERS OF BONE DENSITY AND STRUCTURE, UNSPECIFIED SITE: ICD-10-CM

## 2024-06-25 DIAGNOSIS — E03.9 HYPOTHYROIDISM, UNSPECIFIED: ICD-10-CM

## 2024-06-25 PROCEDURE — 99214 OFFICE O/P EST MOD 30 MIN: CPT

## 2024-06-25 PROCEDURE — G2211 COMPLEX E/M VISIT ADD ON: CPT

## 2024-12-20 NOTE — ED ADULT NURSE NOTE - CAS DISCH ACCOMP BY
Letter written, printed and ready to picked up. Letter is up front in folder.    Patient notified  
Okay to do as requested  
Patient is still having issues with sore throat, loss of voice and issues with swallowing, has completed course of prednisone prescribed by Urgent Care Provider 12/15/24. Patient is requesting new work note today 12/20/24 through 12/22/24 and be allowed to return on 12/23/24 with no restrictions.  
Self

## 2025-02-05 ENCOUNTER — NON-APPOINTMENT (OUTPATIENT)
Age: 75
End: 2025-02-05

## 2025-02-10 ENCOUNTER — APPOINTMENT (OUTPATIENT)
Dept: ENDOCRINOLOGY | Facility: CLINIC | Age: 75
End: 2025-02-10

## 2025-03-11 ENCOUNTER — APPOINTMENT (OUTPATIENT)
Dept: ENDOCRINOLOGY | Facility: CLINIC | Age: 75
End: 2025-03-11
Payer: MEDICARE

## 2025-03-11 VITALS
SYSTOLIC BLOOD PRESSURE: 138 MMHG | HEIGHT: 60 IN | HEART RATE: 85 BPM | DIASTOLIC BLOOD PRESSURE: 80 MMHG | WEIGHT: 183 LBS | BODY MASS INDEX: 35.93 KG/M2 | OXYGEN SATURATION: 98 %

## 2025-03-11 DIAGNOSIS — E03.9 HYPOTHYROIDISM, UNSPECIFIED: ICD-10-CM

## 2025-03-11 DIAGNOSIS — M85.80 OTHER SPECIFIED DISORDERS OF BONE DENSITY AND STRUCTURE, UNSPECIFIED SITE: ICD-10-CM

## 2025-03-11 PROCEDURE — 99214 OFFICE O/P EST MOD 30 MIN: CPT

## 2025-03-11 PROCEDURE — G2211 COMPLEX E/M VISIT ADD ON: CPT

## 2025-03-12 ENCOUNTER — TRANSCRIPTION ENCOUNTER (OUTPATIENT)
Age: 75
End: 2025-03-12

## 2025-03-12 LAB
25(OH)D3 SERPL-MCNC: 43.7 NG/ML
ALBUMIN SERPL ELPH-MCNC: 4.5 G/DL
ALP BLD-CCNC: 85 U/L
ALT SERPL-CCNC: 16 U/L
ANION GAP SERPL CALC-SCNC: 15 MMOL/L
AST SERPL-CCNC: 19 U/L
BILIRUB SERPL-MCNC: 0.4 MG/DL
BUN SERPL-MCNC: 18 MG/DL
CALCIUM SERPL-MCNC: 9.3 MG/DL
CHLORIDE SERPL-SCNC: 105 MMOL/L
CO2 SERPL-SCNC: 23 MMOL/L
CREAT SERPL-MCNC: 0.85 MG/DL
EGFRCR SERPLBLD CKD-EPI 2021: 72 ML/MIN/1.73M2
GLUCOSE SERPL-MCNC: 110 MG/DL
POTASSIUM SERPL-SCNC: 4.9 MMOL/L
PROT SERPL-MCNC: 7.2 G/DL
SODIUM SERPL-SCNC: 142 MMOL/L
TSH SERPL-ACNC: 2.06 UIU/ML

## 2025-07-02 ENCOUNTER — APPOINTMENT (OUTPATIENT)
Dept: OBGYN | Facility: CLINIC | Age: 75
End: 2025-07-02

## 2025-07-02 VITALS
HEIGHT: 60 IN | WEIGHT: 185 LBS | DIASTOLIC BLOOD PRESSURE: 81 MMHG | SYSTOLIC BLOOD PRESSURE: 157 MMHG | BODY MASS INDEX: 36.32 KG/M2

## 2025-07-02 PROCEDURE — G0101: CPT

## 2025-07-03 ENCOUNTER — NON-APPOINTMENT (OUTPATIENT)
Age: 75
End: 2025-07-03

## 2025-07-09 ENCOUNTER — APPOINTMENT (OUTPATIENT)
Dept: MAMMOGRAPHY | Facility: CLINIC | Age: 75
End: 2025-07-09
Payer: MEDICARE

## 2025-07-09 ENCOUNTER — RESULT REVIEW (OUTPATIENT)
Age: 75
End: 2025-07-09

## 2025-07-09 ENCOUNTER — APPOINTMENT (OUTPATIENT)
Dept: ULTRASOUND IMAGING | Facility: CLINIC | Age: 75
End: 2025-07-09
Payer: MEDICARE

## 2025-07-09 PROCEDURE — 77063 BREAST TOMOSYNTHESIS BI: CPT

## 2025-07-09 PROCEDURE — 77067 SCR MAMMO BI INCL CAD: CPT

## 2025-07-09 PROCEDURE — 76641 ULTRASOUND BREAST COMPLETE: CPT | Mod: 50,GA

## 2025-07-15 ENCOUNTER — RESULT REVIEW (OUTPATIENT)
Age: 75
End: 2025-07-15

## 2025-07-15 ENCOUNTER — OUTPATIENT (OUTPATIENT)
Dept: OUTPATIENT SERVICES | Facility: HOSPITAL | Age: 75
LOS: 1 days | End: 2025-07-15
Payer: MEDICARE

## 2025-07-15 ENCOUNTER — APPOINTMENT (OUTPATIENT)
Dept: ENDOCRINOLOGY | Facility: CLINIC | Age: 75
End: 2025-07-15
Payer: MEDICARE

## 2025-07-15 ENCOUNTER — APPOINTMENT (OUTPATIENT)
Dept: MAMMOGRAPHY | Facility: IMAGING CENTER | Age: 75
End: 2025-07-15
Payer: MEDICARE

## 2025-07-15 ENCOUNTER — APPOINTMENT (OUTPATIENT)
Dept: ULTRASOUND IMAGING | Facility: IMAGING CENTER | Age: 75
End: 2025-07-15
Payer: MEDICARE

## 2025-07-15 VITALS — HEIGHT: 60 IN | WEIGHT: 185 LBS | BODY MASS INDEX: 36.32 KG/M2

## 2025-07-15 DIAGNOSIS — Z00.8 ENCOUNTER FOR OTHER GENERAL EXAMINATION: ICD-10-CM

## 2025-07-15 DIAGNOSIS — Z98.89 OTHER SPECIFIED POSTPROCEDURAL STATES: Chronic | ICD-10-CM

## 2025-07-15 PROCEDURE — 76642 ULTRASOUND BREAST LIMITED: CPT

## 2025-07-15 PROCEDURE — G0279: CPT | Mod: 26

## 2025-07-15 PROCEDURE — G0279: CPT

## 2025-07-15 PROCEDURE — 77065 DX MAMMO INCL CAD UNI: CPT

## 2025-07-15 PROCEDURE — 76642 ULTRASOUND BREAST LIMITED: CPT | Mod: 26,LT

## 2025-07-15 PROCEDURE — 77065 DX MAMMO INCL CAD UNI: CPT | Mod: 26,RT

## 2025-07-15 PROCEDURE — 77085 DXA BONE DENSITY AXL VRT FX: CPT

## 2025-07-17 ENCOUNTER — NON-APPOINTMENT (OUTPATIENT)
Age: 75
End: 2025-07-17

## 2025-07-21 ENCOUNTER — APPOINTMENT (OUTPATIENT)
Dept: MRI IMAGING | Facility: CLINIC | Age: 75
End: 2025-07-21
Payer: MEDICARE

## 2025-07-21 PROCEDURE — 73718 MRI LOWER EXTREMITY W/O DYE: CPT | Mod: LT

## 2025-07-28 NOTE — ED ADULT TRIAGE NOTE - HISTORY OF COVID-19 VACCINATION
Recommendations for healthy lifestyle  - Nutrition: It is important to eat 3 meals a day and not skip meals (especially breakfast!). An overall goal is to get 5 servings of fruits and vegetables every day and limit junk food and sugary drinks (including juice) to special occasions. You can work up to these goals slowly, step-by-step.      - Activity: Overall goals are to do some type of physical activity at least 30-60 minutes daily and limit screen time to less than 2 hours per day.       - Sleep: It is recommended that you get 8-10 hours of sleep at night, limit naps during the day and only use your bed for sleeping.     It can be helpful to track your goals on a calendar that you put in a place that you will see it often (bathroom, bedroom, kitchen) or on your phone.     GLP-1 Receptor Agonists (examples: liraglutide and semaglutide)     What are these medicines used for?    These medications were first developed to treat diabetes but have also been shown to have a significant effect of weight loss.      How do they work?    They work by affecting a hormone in your body that leads to decreased appetite, decreased food intake, and decreased rate that the stomach empties into the small intestine.       These medications may help you:   Feel less hungry   Lower food cravings   Increase your feeling of control around eating habits       Like all weight loss medications, these medications work best in combination with healthy nutrition, physical activity, and sleep.        How should I take these medications?    These medications are given by a small injection under the skin (“subcutaneous”) either once a day or once a week. The usual dosing is listed below, but please follow your medical provider's instructions for your specific plan.        Liraglutide (brand name: Saxenda), subcutaneous, daily    Week 1: 0.6mg every day   Week 2: 1.2mg every day   Week 3: 1.8mg every day   Week 4: 2.4mg every day   Week 5 and  beyond: 3.0mg every day or maximum tolerated dose     Note: Daily dose may be split between pens. Please discuss this with your medical team or healthcare provider     Please go to the [employer] for instructions and a video regarding the technique to give yourself injections:   www.saxenda.com     Semaglutude (brand name: Wegovy), subcutaneous, weekly    Week 1-4: 0.25mg once weekly   Week 5-8: 0.5mg once weekly   Week 9-12: 1mg once weekly   Week 13-16: 1.7mg once weekly   Week 17 and beyond: 2.4mg once weekly or maximum tolerated dose     Please go to the [employer] for instructions and a video regarding the technique to give yourself injections:   www.wegovy.[email or web address]     Semaglutude (brand name: Ozempic), subcutaneous, weekly    Week 1-4: 0.25mg once weekly   Week 5-8: 0.5mg once weekly   Week 9-12: 1mg once weekly   Week 13-16: 2mg once weekly     Please go to the Ozempic for instructions and a video regarding the technique to give yourself injections:   [email or web address]     What if I miss a dose?   Liraglutide   If a dose is missed, restart the next day. An extra dose or increase in dose should not be taken to make up for the missed dose.    If more than 3 days have passed since the last dose, please contact your health care provider about how to restart the medication      Semaglutide:    If you miss a dose, and your next dose is more than 2 days (48 hours) away, take the missed dose when you remember   If you miss a dose, and the next scheduled dose is less than 2 days away (48 hours), do not give the dose. Take your next dose on the regularly scheduled day.   If you miss 2 or more doses, take the next dose on the regularly scheduled day or call your health care provider to talk about how to restart due to possible side effects     Storage   Liraglutide   Store new, unused Saxenda®?pens in the refrigerator between 36°F and 46°F (2°C to 8°C).    After first use, store in a refrigerator or at room temperature between 59°F  and 86°F (15°C to 30°C).    Pens in use should be thrown away after 30 days even if they still have Saxenda®?left in them.    Don't freeze Saxenda®. Saxenda®?that has been frozen must not be used.       Semaglutide:    Store the WEGOVY single-dose pen in the refrigerator from 36°F to 46°F (2°C to 8°C).    If needed, prior to taking off the cap, the pen can be kept from 46°F to 86°F (8°C to 30°C) up to 28 days.    Do not freeze.      Protect WEGOVY from light. WEGOVY must be kept in the original carton until its time to inject.    Discard the WEGOVYpen after use.     Ozempic:   Store your new, unused Ozempic®?pens?in the refrigerator between 36°F to 46°F (2°C to 8°C).    Store your pen in use for 56 days at room temperature between 59ºF to 86ºF (15ºC to 30ºC) or in a refrigerator between 36°F to 46°F (2°C to 8°C).    Discard after 56 days.     Are these medications safe?    For weight loss, both liraglutide and semaglutide are FDA approved for age 12 years and older. This class of medication is also approved for people who have diabetes. As with any medication, there may be side effects. More serious things that can happen include allergic reactions, thyroid tumors, pancreatitis, gallbladder problems, kidney problems, and worsened depression.       You should not take these medications if you think you may be pregnant or are planning to become pregnant. You should not take these medications if you or a family member has medullary thyroid carcinoma or if you have multiple endocrine neoplasia type 2.       What are the possible side effects?    If you notice these common, but less serious side effects, talk with your medical provider:   Abdominal pain, nausea, vomiting, heartburn, diarrhea, constipation   Headache   Tiredness   Dizziness    Reaction at the injection site   Low blood sugar (if taking this medication with certain diabetes medications)   Increased heart rate       Call your doctor right away if you notice  any of the following less common side effects:    Lump or swelling in your neck, hoarseness, trouble swallowing or shortness of breath (could be related to a new thyroid problem)   Severe abdominal pain, especially if it travels to the back (possible signs of pancreatitis)   Abdominal pain (especially in your upper stomach) with fever, yellowing of the skin or eyes or david-colored stools (possible gallbladder problem)   If you develop rash, facial swelling, and/or trouble breathing (allergic reaction), call your medical provider or if severe, call 911     How much does it cost?    The out of pocket cost varies by insurance, but if you are asked to pay >$50 per month, please call us before filling the prescription. You can visit the following websites to see if you qualify for a medication savings card.     Wegovy: https://www.Lastline/wegovy/savings-card.html   Saxenda: https://www.[employer]ifest/   Ozempic: https://www.[employer]/ozempic/savings-card.[email or web address]       (Developed by: [facility])          Topiramate and Phentermine Combination (Qsymia)  What are these medicines used for?    Topiramate helps patients feel less hungry and feel full more quickly. It may also help patients decrease binge eating behaviors.     Phentermine is thought to work in the brain to decrease appetite.      Both medications are used in people who carry extra weight AND who are enrolled in a weight loss program that includes dietary, physical activity, and behavior changes.       How do they work?    Topiramate was first developed to treat seizures in children and migraine headaches in adults. It affects chemical messengers in the brain, but the exact way it works to change appetite is unknown.   Phentermine is thought to work in the brain to decrease appetite.      Topiramate and phentermine may help you:    Feel less interest in eating in between meals    Think less about food  and eating    Feel full or satisfied sooner    Have an easier time eating less      Topiramate extended release in combination with phentermine has been FDA approved for weight loss in patients 12 and older (marketed as Qsymia)    For some patients, these medications work right away. They feel and think quite differently about food. Other patients don't feel much of a change but find that they lose weight. Finally, some patients do not have these feelings and do not have improvements in weight. For these patients, medications may be stopped after 3 months.       Like all weight loss medications, these medications work best when you help it work. This means:    Drinking water instead of sugar sweetened drinks (e.g. regular soda, juice)   Removing tempting high calorie (“junk”) food from the house    Staying away from situations or people that trigger your food cravings    Eating your meals at a table with all screens off (TV, phone)   Keeping track of what you are eating and drinking     Are these medications safe?    This combination of medication is approved for patients 12 and up for eight management    Topiramate   Although topiramate alone is not approved by the FDA for weight loss, it is used commonly in weight management clinics because of its effects on appetite.  It is also approved for children as young as 2 years old for other reasons such as seizures and migraines.     Most people tolerate topiramate with no problems. Please tell your medical provider if you have a history of kidney stones, if you are taking phenytoin (brand name: Dilantin) or birth control pills, or if you are pregnant. Topiramate is harmful in pregnancy. Topiramate can decrease your ability to tolerate hot weather. You should be sure to drink plenty of water to prevent dehydration and kidney stones. Your medical provider will also check for possible interactions between your usual medications and topiramate.      One of the dangers of  topiramate is the possibility of birth defects. If you get pregnant when you are taking topiramate, there is the risk that your baby will be born with a cleft lip or palate. If you are on topiramate and are of child bearing age, you must be on a reliable form of birth control or refrain from sex. Birth control pills may not work as effectively while taking topiramate.     Phentermine   Phentermine is not FDA approved for use in children or adolescents under 16 years of age by itself. You should not take phentermine if you have high blood pressure, heart disease, hyperthyroidism (overactive thyroid gland), glaucoma, if you are taking stimulant ADHD medications, if you have struggled with drug abuse, or if you are pregnant. Your medical provider will also check for possible interactions between your usual medications and phentermine.     What are the side effects?    Call your doctor right away if you notice any of the starred side effects:      Topiramate   Change in mood, especially depression or thoughts of suicide*     Severe pain in your sides, back, or groin (which may indicate a kidney stone)*   Sudden change in vision or eye pain*   New severe rash*     Phentermine   Chest pain*   Shortness of breath*    Difficulty doing exercises that you had been able to do before*    Swelling of the legs or ankles*    Severe restlessness, anxiety, or dizziness*    Increased blood pressure or heart rate       If you notice these less serious side effects, talk with your medical provider:     Topiramate   Mental fogginess, trouble concentrating, memory problems   Numbness or tingling in your hands or feet   Fatigue   New overheating   Nausea, vomiting, diarrhea or constipation     Phentermine   Trouble sleeping    Diarrhea or constipation    Dry mouth        (Developed by: [facility] & [facility]- v.1.23.19)   Yes